# Patient Record
Sex: FEMALE | Race: WHITE | Employment: STUDENT | URBAN - METROPOLITAN AREA
[De-identification: names, ages, dates, MRNs, and addresses within clinical notes are randomized per-mention and may not be internally consistent; named-entity substitution may affect disease eponyms.]

---

## 2017-01-06 PROBLEM — O09.629 SUPERVISION OF HIGH-RISK PREGNANCY OF YOUNG MULTIGRAVIDA: Status: ACTIVE | Noted: 2017-01-06

## 2017-01-06 PROBLEM — Z78.9 CONCEIVED BY IN VITRO FERTILIZATION: Status: ACTIVE | Noted: 2017-01-06

## 2017-01-06 PROBLEM — O30.009 TWIN PREGNANCY, ANTEPARTUM, UNSPECIFIED MULTIPLE GESTATION TYPE: Status: ACTIVE | Noted: 2017-01-06

## 2017-01-06 PROBLEM — E03.9 HYPOTHYROIDISM, UNSPECIFIED TYPE: Status: ACTIVE | Noted: 2017-01-06

## 2017-01-19 PROBLEM — O30.043 DICHORIONIC DIAMNIOTIC TWIN PREGNANCY IN THIRD TRIMESTER: Status: ACTIVE | Noted: 2017-01-19

## 2017-01-20 ENCOUNTER — HOSPITAL ENCOUNTER (OUTPATIENT)
Facility: HOSPITAL | Age: 32
Setting detail: OBSERVATION
Discharge: HOME | End: 2017-01-20
Attending: OBSTETRICS & GYNECOLOGY | Admitting: OBSTETRICS & GYNECOLOGY
Payer: MEDICAID

## 2017-01-20 VITALS
TEMPERATURE: 97 F | DIASTOLIC BLOOD PRESSURE: 65 MMHG | RESPIRATION RATE: 14 BRPM | SYSTOLIC BLOOD PRESSURE: 118 MMHG | HEART RATE: 108 BPM

## 2017-01-20 PROBLEM — Z34.90 PREGNANCY: Status: ACTIVE | Noted: 2017-01-20

## 2017-01-20 LAB
BILIRUBIN URINE: NEGATIVE
BLOOD URINE: NEGATIVE
CONTROL RUN WITHIN 24 HOURS?: YES
GLUCOSE URINE: NEGATIVE
KETONE URINE: NEGATIVE
LEUKOCYTE ESTERASE URINE: NEGATIVE
NITRITE URINE: NEGATIVE
PH URINE: 7 (ref 5–8)
PROTEIN URINE: NEGATIVE
SPEC GRAVITY: 1.02 (ref 1–1.03)
URINE CLARITY: CLEAR
URINE COLOR: YELLOW
UROBILINOGEN URINE: 0.2

## 2017-01-20 PROCEDURE — 81002 URINALYSIS NONAUTO W/O SCOPE: CPT

## 2017-01-20 PROCEDURE — 96372 THER/PROPH/DIAG INJ SC/IM: CPT

## 2017-01-20 RX ORDER — BETAMETHASONE SODIUM PHOSPHATE AND BETAMETHASONE ACETATE 3; 3 MG/ML; MG/ML
12 INJECTION, SUSPENSION INTRA-ARTICULAR; INTRALESIONAL; INTRAMUSCULAR; SOFT TISSUE ONCE
Status: COMPLETED | OUTPATIENT
Start: 2017-01-20 | End: 2017-01-20

## 2017-01-20 RX ORDER — BETAMETHASONE SODIUM PHOSPHATE AND BETAMETHASONE ACETATE 3; 3 MG/ML; MG/ML
INJECTION, SUSPENSION INTRA-ARTICULAR; INTRALESIONAL; INTRAMUSCULAR; SOFT TISSUE
Status: DISCONTINUED
Start: 2017-01-20 | End: 2017-01-20

## 2017-01-20 RX ORDER — BETAMETHASONE SODIUM PHOSPHATE AND BETAMETHASONE ACETATE 3; 3 MG/ML; MG/ML
12 INJECTION, SUSPENSION INTRA-ARTICULAR; INTRALESIONAL; INTRAMUSCULAR; SOFT TISSUE ONCE
Status: CANCELLED | OUTPATIENT
Start: 2017-01-21

## 2017-01-20 NOTE — PROGRESS NOTES
Discharged to home per ambulatory in stable condition with written and verbal instructions. Pt to return tomorrow for second bmz injection, monitoring, and ffn    Pt is a new transfer to this group, needs prenatal labs drawn.

## 2017-01-20 NOTE — PROGRESS NOTES
27w3d Di/Di twins sent to L&D for evaluation after noted Cx change in office. Patient without c/o denies UC, cramping, pressure or LOF. She has h/o one previous .   This IVF twins last US this week Vtx/BR  Sve ftp/50/-4  FHT reactive x 2, uterine ir

## 2017-01-20 NOTE — NST
Nonstress Test   Patient: Vicki Benavidez    Gestation: 27w3d    NST:       Variability: Moderate    Variability - Fetus B: Moderate      Accelerations: Yes    Accelerations -  Fetus B: Yes      Decelerations: None     Decelerations - Fetus B: Variabl

## 2017-01-20 NOTE — PROGRESS NOTES
Pt is a 32year old female admitted to TRG4/TRG4-A. Patient presents with:   Complication: cervical change noted in office     Pt is  27w3d intra-uterine pregnancy. History obtained, consents signed.  Oriented to room, staff, and plan of

## 2017-01-21 ENCOUNTER — HOSPITAL ENCOUNTER (OUTPATIENT)
Facility: HOSPITAL | Age: 32
Discharge: HOME | End: 2017-01-21
Attending: OBSTETRICS & GYNECOLOGY | Admitting: OBSTETRICS & GYNECOLOGY
Payer: MEDICAID

## 2017-01-21 VITALS
SYSTOLIC BLOOD PRESSURE: 125 MMHG | HEIGHT: 67.01 IN | WEIGHT: 137.38 LBS | TEMPERATURE: 98 F | BODY MASS INDEX: 21.56 KG/M2 | HEART RATE: 112 BPM | DIASTOLIC BLOOD PRESSURE: 67 MMHG

## 2017-01-21 LAB — FETAL FIBRINECTIN: NEGATIVE

## 2017-01-21 PROCEDURE — 96372 THER/PROPH/DIAG INJ SC/IM: CPT

## 2017-01-21 PROCEDURE — 82731 ASSAY OF FETAL FIBRONECTIN: CPT | Performed by: OBSTETRICS & GYNECOLOGY

## 2017-01-21 RX ORDER — BETAMETHASONE SODIUM PHOSPHATE AND BETAMETHASONE ACETATE 3; 3 MG/ML; MG/ML
INJECTION, SUSPENSION INTRA-ARTICULAR; INTRALESIONAL; INTRAMUSCULAR; SOFT TISSUE
Status: DISCONTINUED
Start: 2017-01-21 | End: 2017-01-21

## 2017-01-21 RX ORDER — BETAMETHASONE SODIUM PHOSPHATE AND BETAMETHASONE ACETATE 3; 3 MG/ML; MG/ML
12 INJECTION, SUSPENSION INTRA-ARTICULAR; INTRALESIONAL; INTRAMUSCULAR; SOFT TISSUE ONCE
Status: COMPLETED | OUTPATIENT
Start: 2017-01-21 | End: 2017-01-21

## 2017-01-22 NOTE — PROGRESS NOTES
WITH EDC 2017 ( 27 4/7 WEEKS) IVF TWINS PRESENTS TO L&D FOR SECOND DOSE OF BETAMETHASONE, NST, AND TO OBTAIN FFN. SECOND DOSE OF BETAMETHASONE GIVEN, NST OBTAINED. FFN NEGATIVE. WILL UPDATE DR. Maya Blizzard OF THE ABOVE ASSESSMENT.

## 2017-01-22 NOTE — NST
Nonstress Test   Patient: Kayla Melgar    Gestation: 27w4d    NST:       Variability: Moderate    Variability - Fetus B: Moderate      Accelerations: Yes    Accelerations -  Fetus B: No      Decelerations: Variable     Decelerations - Fetus B: None

## 2017-02-02 ENCOUNTER — LAB ENCOUNTER (OUTPATIENT)
Dept: LAB | Facility: HOSPITAL | Age: 32
End: 2017-02-02
Attending: OBSTETRICS & GYNECOLOGY
Payer: MEDICAID

## 2017-02-02 DIAGNOSIS — E03.9 HYPOTHYROIDISM, UNSPECIFIED TYPE: ICD-10-CM

## 2017-02-02 DIAGNOSIS — Z36.9 ENCOUNTER FOR ANTENATAL SCREENING OF MOTHER: ICD-10-CM

## 2017-02-02 LAB
FREE T4: 1.1 NG/DL (ref 0.9–1.8)
GLUCOSE 1H P GLC SERPL-MCNC: 84 MG/DL
GLUCOSE BLD-MCNC: 112 MG/DL (ref 65–99)
HCT VFR BLD AUTO: 35.8 % (ref 34–50)
HGB BLD-MCNC: 12.3 G/DL (ref 12–16)
TSI SER-ACNC: 0.55 MIU/ML (ref 0.35–5.5)

## 2017-02-02 PROCEDURE — 82950 GLUCOSE TEST: CPT

## 2017-02-02 PROCEDURE — 84439 ASSAY OF FREE THYROXINE: CPT

## 2017-02-02 PROCEDURE — 85014 HEMATOCRIT: CPT

## 2017-02-02 PROCEDURE — 84443 ASSAY THYROID STIM HORMONE: CPT

## 2017-02-02 PROCEDURE — 36415 COLL VENOUS BLD VENIPUNCTURE: CPT

## 2017-02-02 PROCEDURE — 85018 HEMOGLOBIN: CPT

## 2017-02-02 PROCEDURE — 82962 GLUCOSE BLOOD TEST: CPT

## 2017-02-02 PROCEDURE — 86592 SYPHILIS TEST NON-TREP QUAL: CPT

## 2017-02-03 LAB — RPR SER QL: NONREACTIVE

## 2017-02-03 PROCEDURE — 36415 COLL VENOUS BLD VENIPUNCTURE: CPT | Performed by: OBSTETRICS & GYNECOLOGY

## 2017-02-03 PROCEDURE — 82731 ASSAY OF FETAL FIBRONECTIN: CPT | Performed by: OBSTETRICS & GYNECOLOGY

## (undated) NOTE — IP AVS SNAPSHOT
[facility] [place] [address] [place], [address] ~ [phone]                Discharge Summary   1/20/2017    Glenda Ruff Older           Admission Information        Provider Department    1/20/2017 Carrolyn Kawasaki, MD  1nw-A up to 6 weeks following childbirth.   Contact your doctor or midwife immediately if you experience any of the following symptoms:  · Headache that does not go away  · Visual changes (seeing spots, blurred vision or partial vision loss)  · Feeling nauseated [phone]            Mar 03, 2017  1:50 PM   OB with Mane Singh MD   [facility] - [address] [facility] 2)    [address] [facility] [address]  [address]            Mar 10, 2017 11:30 - If you have concerns related to behavioral health issues or thoughts of harming yourself, contact [address] [place] [address] at [phone].     - If you don’t have insurance, Ron Diez has partnered with [employer]